# Patient Record
Sex: FEMALE | Race: WHITE | NOT HISPANIC OR LATINO | Employment: UNEMPLOYED | URBAN - METROPOLITAN AREA
[De-identification: names, ages, dates, MRNs, and addresses within clinical notes are randomized per-mention and may not be internally consistent; named-entity substitution may affect disease eponyms.]

---

## 2022-12-13 ENCOUNTER — HOSPITAL ENCOUNTER (OUTPATIENT)
Facility: HOSPITAL | Age: 57
Discharge: HOME | End: 2022-12-13
Attending: EMERGENCY MEDICINE

## 2022-12-13 VITALS
SYSTOLIC BLOOD PRESSURE: 145 MMHG | TEMPERATURE: 97.4 F | DIASTOLIC BLOOD PRESSURE: 81 MMHG | WEIGHT: 170 LBS | BODY MASS INDEX: 29.02 KG/M2 | HEART RATE: 76 BPM | OXYGEN SATURATION: 100 % | RESPIRATION RATE: 16 BRPM | HEIGHT: 64 IN

## 2022-12-13 DIAGNOSIS — U07.1 COVID-19 VIRUS INFECTION: Primary | ICD-10-CM

## 2022-12-13 DIAGNOSIS — R05.1 ACUTE COUGH: ICD-10-CM

## 2022-12-13 LAB
FLUAV SUBTYP SPEC NAA+PROBE: NOT DETECTED
FLUBV RNA ISLT QL NAA+PROBE: NOT DETECTED
SARS-COV-2 RNA RESP QL NAA+PROBE: DETECTED
STREP A PCR: NOT DETECTED

## 2022-12-13 PROCEDURE — 99203 OFFICE O/P NEW LOW 30 MIN: CPT | Performed by: EMERGENCY MEDICINE

## 2022-12-13 PROCEDURE — 87651 STREP A DNA AMP PROBE: CPT | Performed by: EMERGENCY MEDICINE

## 2022-12-13 PROCEDURE — 87880 STREP A ASSAY W/OPTIC: CPT | Performed by: EMERGENCY MEDICINE

## 2022-12-13 PROCEDURE — 87636 SARSCOV2 & INF A&B AMP PRB: CPT | Performed by: EMERGENCY MEDICINE

## 2022-12-13 PROCEDURE — G0463 HOSPITAL OUTPT CLINIC VISIT: HCPCS | Performed by: EMERGENCY MEDICINE

## 2022-12-13 PROCEDURE — EDLOS: Performed by: EMERGENCY MEDICINE

## 2022-12-13 RX ORDER — DEXTROMETHORPHAN HYDROBROMIDE AND PROMETHAZINE HYDROCHLORIDE 15; 6.25 MG/5ML; MG/5ML
5-10 SYRUP ORAL 4 TIMES DAILY PRN
Qty: 118 ML | Refills: 0 | Status: SHIPPED | OUTPATIENT
Start: 2022-12-13

## 2022-12-13 NOTE — FSED PROVIDER NOTE
Subjective   History of Present Illness  The patient is a 57-year-old  female who presents emergency room with complaints of upper respiratory infection.  Patient reports that her symptoms started 4 days ago.  Patient reports sore throat, headache, congestion and cough.  Patient states that her cough is harsh and keeps her up at night.  She states that she has had generalized body aches and fatigue.  She reports that her symptoms have been persistent and gradually worsening.  She states that this morning, she woke up and her left ear was bothering her, so she decided to come to the ER to get checked out.        Review of Systems   Constitutional: Positive for chills, fatigue and fever.   HENT: Positive for congestion, ear pain and rhinorrhea.    Eyes: Negative.    Respiratory: Positive for cough. Negative for chest tightness and shortness of breath.    Cardiovascular: Negative for chest pain and palpitations.   Gastrointestinal: Negative for abdominal pain, diarrhea, nausea and vomiting.   Genitourinary: Negative.    Musculoskeletal: Positive for arthralgias and myalgias.   Skin: Negative.  Negative for rash.   Neurological: Positive for weakness and headaches. Negative for syncope and numbness.   Psychiatric/Behavioral: Negative.    All other systems reviewed and are negative.      No past medical history on file.    No Known Allergies    No past surgical history on file.    No family history on file.    Social History     Socioeconomic History   • Marital status: Single           Objective   Physical Exam  Vitals and nursing note reviewed.   Constitutional:       General: She is not in acute distress.     Appearance: She is normal weight. She is not ill-appearing.      Comments: The following exam was performed from a distance of 6 feet.  The patient had presumed or suspected upper respiratory infection that may be COVID-19.  The patient was in a negative pressure room if possible.  The provider as well  as the patient and/or family members were asked to wear a mask when possible.   HENT:      Head: Normocephalic and atraumatic.      Right Ear: External ear normal.      Left Ear: External ear normal.      Nose: Nose normal.   Eyes:      Extraocular Movements: Extraocular movements intact.      Conjunctiva/sclera: Conjunctivae normal.      Pupils: Pupils are equal, round, and reactive to light.   Cardiovascular:      Rate and Rhythm: Normal rate and regular rhythm.      Comments: Per the bedside cardiac monitor  Pulmonary:      Effort: Pulmonary effort is normal. No respiratory distress.      Comments: Patient's oxygen saturation was greater than 90% per the bedside pulse oximetry.  There were NO audible abnormal breath sounds present.  Abdominal:      General: Abdomen is flat. There is no distension.   Musculoskeletal:         General: No swelling, deformity or signs of injury. Normal range of motion.      Cervical back: Normal range of motion and neck supple.   Skin:     General: Skin is warm.      Capillary Refill: Capillary refill takes less than 2 seconds.      Findings: No erythema.   Neurological:      General: No focal deficit present.      Mental Status: She is alert and oriented to person, place, and time. Mental status is at baseline.   Psychiatric:         Mood and Affect: Mood normal.         Procedures           ED Course  ED Course as of 12/13/22 0803   Tue Dec 13, 2022   0757 Patient is COVID-19 positive. [KZ]   0802 Strep screen negative.  Patient advised supportive care and written some cough medication. [KZ]      ED Course User Index  [KZ] Ladarius Quiñonez MD                                           [facility]    Final diagnoses:   COVID-19 virus infection   Acute cough       ED Disposition  ED Disposition     ED Disposition   Discharge    Condition   Stable    Comment   --             Your PCP    Schedule an appointment as soon as possible for a visit in 1 week  For repeat evaluation         Medication  List      New Prescriptions    promethazine-dextromethorphan 6.25-15 MG/5ML syrup  Commonly known as: PROMETHAZINE-DM  Take 5-10 mL by mouth 4 (Four) Times a Day As Needed for Cough.           Where to Get Your Medications      These medications were sent to [facility]/pharmacy #[id number] - [place], [place] - [address] [facility] - [phone]  - [phone] [address] [place] [address]    Hours: 24-hours Phone: [phone]   · promethazine-dextromethorphan 6.25-15 MG/5ML syrup

## 2022-12-13 NOTE — DISCHARGE INSTRUCTIONS
You have been diagnosed with COVID-19 infection, no antibiotics are necessary as this condition is caused by a virus.  Antivirals have not been prescribed for you because of either not enough risk factors or interactions with your home medications.    Rotate Tylenol and ibuprofen every 3-4 hours as needed for fever and pain.  Over-the-counter medications may be also helpful for your symptoms.  Take cough medication as prescribed.  Follow-up with primary care physician in 1 week for recheck of your symptoms.  Quarantine period is 5 days for vaccinated patients and 7 days for nonvaccinated patients.

## 2023-01-06 ENCOUNTER — HOSPITAL ENCOUNTER (EMERGENCY)
Facility: HOSPITAL | Age: 58
Discharge: HOME | End: 2023-01-06
Attending: EMERGENCY MEDICINE | Admitting: EMERGENCY MEDICINE
Payer: COMMERCIAL

## 2023-01-06 ENCOUNTER — APPOINTMENT (OUTPATIENT)
Dept: CT IMAGING | Facility: HOSPITAL | Age: 58
End: 2023-01-06
Payer: COMMERCIAL

## 2023-01-06 VITALS
BODY MASS INDEX: 28.32 KG/M2 | RESPIRATION RATE: 16 BRPM | HEIGHT: 65 IN | HEART RATE: 79 BPM | WEIGHT: 170 LBS | DIASTOLIC BLOOD PRESSURE: 72 MMHG | SYSTOLIC BLOOD PRESSURE: 147 MMHG | OXYGEN SATURATION: 95 % | TEMPERATURE: 98.3 F

## 2023-01-06 DIAGNOSIS — N12 PYELONEPHRITIS: Primary | ICD-10-CM

## 2023-01-06 DIAGNOSIS — R10.9 RIGHT FLANK PAIN: ICD-10-CM

## 2023-01-06 LAB
BILIRUB UR QL STRIP: NEGATIVE
CLARITY UR: CLEAR
COLOR UR: YELLOW
GLUCOSE UR STRIP-MCNC: NEGATIVE MG/DL
HGB UR QL STRIP.AUTO: ABNORMAL
KETONES UR QL STRIP: NEGATIVE
LEUKOCYTE ESTERASE UR QL STRIP.AUTO: ABNORMAL
NITRITE UR QL STRIP: POSITIVE
PH UR STRIP.AUTO: 5.5 [PH] (ref 5–8)
PROT UR QL STRIP: ABNORMAL
SP GR UR STRIP: 1.01 (ref 1–1.03)
UROBILINOGEN UR QL STRIP: ABNORMAL

## 2023-01-06 PROCEDURE — 25010000002 CEFTRIAXONE PER 250 MG: Performed by: EMERGENCY MEDICINE

## 2023-01-06 PROCEDURE — 99283 EMERGENCY DEPT VISIT LOW MDM: CPT

## 2023-01-06 PROCEDURE — 99284 EMERGENCY DEPT VISIT MOD MDM: CPT | Performed by: EMERGENCY MEDICINE

## 2023-01-06 PROCEDURE — 74176 CT ABD & PELVIS W/O CONTRAST: CPT

## 2023-01-06 PROCEDURE — 96372 THER/PROPH/DIAG INJ SC/IM: CPT

## 2023-01-06 PROCEDURE — 81003 URINALYSIS AUTO W/O SCOPE: CPT | Performed by: EMERGENCY MEDICINE

## 2023-01-06 RX ORDER — CIPROFLOXACIN 500 MG/1
500 TABLET, FILM COATED ORAL 2 TIMES DAILY
Qty: 20 TABLET | Refills: 0 | Status: SHIPPED | OUTPATIENT
Start: 2023-01-06 | End: 2023-01-16

## 2023-01-06 RX ORDER — HYDROCODONE BITARTRATE AND ACETAMINOPHEN 5; 325 MG/1; MG/1
1 TABLET ORAL EVERY 6 HOURS PRN
Qty: 12 TABLET | Refills: 0 | Status: SHIPPED | OUTPATIENT
Start: 2023-01-06

## 2023-01-06 RX ORDER — PHENAZOPYRIDINE HYDROCHLORIDE 200 MG/1
200 TABLET, FILM COATED ORAL 3 TIMES DAILY PRN
Qty: 6 TABLET | Refills: 0 | Status: SHIPPED | OUTPATIENT
Start: 2023-01-06 | End: 2023-01-08

## 2023-01-06 RX ADMIN — LIDOCAINE HYDROCHLORIDE 1 G: 10 INJECTION, SOLUTION EPIDURAL; INFILTRATION; INTRACAUDAL; PERINEURAL at 17:07

## 2023-01-06 NOTE — Clinical Note
[facility] FSED [place] [id number] [place] [address] [place] [address]  Phone: [phone]    Lavern Christensen was seen and treated in our emergency department on 1/6/2023.  She may return to work on 01/09/2023.         Thank you for choosing [facility].    Ladarius Quiñonez MD

## 2023-01-06 NOTE — DISCHARGE INSTRUCTIONS
Take antibiotics as prescribed.  Take pain medication as prescribed.  Drink plenty of fluids.  Follow-up with urology if not improved in 1 week.  Return to ER for any concerns.

## 2023-01-06 NOTE — FSED PROVIDER NOTE
Subjective   History of Present Illness  The patient is a 58-year-old female who had COVID last month, who presents emergency room with right flank pain.  She reports a 24-hour history of acute worsening of right flank pain.  Patient's flank pain radiates to her right lower abdomen.  She denies any associated nausea or vomiting.  She became concerned today when she started to get some generalized illness feelings including sweating for no reason.  She also reports dysuria, urinary urgency and frequency.        Review of Systems   Constitutional: Negative.  Negative for chills, fatigue and fever.   Eyes: Negative.    Respiratory: Negative for cough, chest tightness and shortness of breath.    Cardiovascular: Negative for chest pain and palpitations.   Gastrointestinal: Negative for abdominal pain, diarrhea, nausea and vomiting.   Genitourinary: Positive for dysuria, flank pain, frequency and urgency.   Skin: Negative.  Negative for rash.   Neurological: Negative.  Negative for syncope, weakness, numbness and headaches.   Psychiatric/Behavioral: Negative.    All other systems reviewed and are negative.      History reviewed. No pertinent past medical history.    No Known Allergies    History reviewed. No pertinent surgical history.    History reviewed. No pertinent family history.    Social History     Socioeconomic History   • Marital status: Single           Objective   Physical Exam  Vitals and nursing note reviewed.   Constitutional:       General: She is in acute distress.      Appearance: Normal appearance. She is normal weight.   HENT:      Right Ear: External ear normal.      Left Ear: External ear normal.      Nose: Nose normal.   Cardiovascular:      Rate and Rhythm: Normal rate.   Pulmonary:      Effort: Pulmonary effort is normal.   Abdominal:      Tenderness: There is right CVA tenderness.   Musculoskeletal:         General: Normal range of motion.      Cervical back: Normal range of motion.   Skin:      Capillary Refill: Capillary refill takes less than 2 seconds.   Neurological:      General: No focal deficit present.      Mental Status: She is alert and oriented to person, place, and time.   Psychiatric:         Mood and Affect: Mood normal.         Procedures           ED Course  ED Course as of 01/06/23 1718 Fri Jan 06, 2023 1715 Patient's urine shows infection.  CT scan shows right kidney inflammation with proximal ureter inflammation as well.  Patient given IM Rocephin and discharged home with ciprofloxacin, Norco and Pyridium. [KZ]      ED Course User Index  [KZ] Ladarius Quiñonez MD                                           Medical Decision Making  Patient presenting with flank pain Differential included UTI, pyelonephritis, diverticulitis, nephrolithiasis, appendicitis and/or kidney stone/ureterolithiasis. Also considered but less likely given history and physical exam included constipation, bowel perforation, gastritis, pancreatitis, mesenteric ischemia. Patient is nontoxic-appearing.  Vital signs within normal limits.     Plan: UA and imaging    Patient CT scan shows probable pyelonephritis.  Urinalysis is positive for infection.    Pyelonephritis: complicated acute illness or injury  Right flank pain: complicated acute illness or injury  Amount and/or Complexity of Data Reviewed  Labs: ordered. Decision-making details documented in ED Course.  Radiology: ordered.      Risk  Prescription drug management.          Final diagnoses:   Pyelonephritis   Right flank pain       ED Disposition  ED Disposition     ED Disposition   Discharge    Condition   Stable    Comment   --             [facility] - [facility]  [address] [facility] [address]  [phone]  Schedule an appointment as soon as possible for a visit in 1 week  If not improved         Medication List      New Prescriptions    ciprofloxacin 500 MG tablet  Commonly known as: CIPRO  Take 1 tablet by mouth 2 (Two) Times a Day for 10 days.      HYDROcodone-acetaminophen 5-325 MG per tablet  Commonly known as: NORCO  Take 1 tablet by mouth Every 6 (Six) Hours As Needed for Severe Pain or Moderate Pain.     phenazopyridine 200 MG tablet  Commonly known as: PYRIDIUM  Take 1 tablet by mouth 3 (Three) Times a Day As Needed for Bladder Spasms for up to 2 days.           Where to Get Your Medications      These medications were sent to [facility]/pharmacy #[id number] - [place], [place] - [address] [facility] - [phone]  - [phone] [address] [place] [address]    Hours: 24-hours Phone: [phone]   · ciprofloxacin 500 MG tablet  · HYDROcodone-acetaminophen 5-325 MG per tablet  · phenazopyridine 200 MG tablet

## 2023-03-12 ENCOUNTER — HOSPITAL ENCOUNTER (OUTPATIENT)
Facility: HOSPITAL | Age: 58
Discharge: HOME | End: 2023-03-12
Attending: EMERGENCY MEDICINE | Admitting: EMERGENCY MEDICINE
Payer: MEDICAID

## 2023-03-12 VITALS
HEART RATE: 83 BPM | SYSTOLIC BLOOD PRESSURE: 128 MMHG | TEMPERATURE: 98.5 F | DIASTOLIC BLOOD PRESSURE: 80 MMHG | RESPIRATION RATE: 18 BRPM | BODY MASS INDEX: 29.02 KG/M2 | WEIGHT: 170 LBS | HEIGHT: 64 IN | OXYGEN SATURATION: 98 %

## 2023-03-12 DIAGNOSIS — S80.212A ABRASION OF LEFT KNEE, INITIAL ENCOUNTER: ICD-10-CM

## 2023-03-12 DIAGNOSIS — S60.511A ABRASION OF RIGHT HAND, INITIAL ENCOUNTER: ICD-10-CM

## 2023-03-12 DIAGNOSIS — S01.511A LIP LACERATION, INITIAL ENCOUNTER: Primary | ICD-10-CM

## 2023-03-12 PROCEDURE — G0463 HOSPITAL OUTPT CLINIC VISIT: HCPCS | Performed by: EMERGENCY MEDICINE

## 2023-03-12 PROCEDURE — 99213 OFFICE O/P EST LOW 20 MIN: CPT | Performed by: EMERGENCY MEDICINE

## 2023-03-12 RX ORDER — CLINDAMYCIN HYDROCHLORIDE 150 MG/1
450 CAPSULE ORAL 3 TIMES DAILY
Qty: 63 CAPSULE | Refills: 0 | Status: SHIPPED | OUTPATIENT
Start: 2023-03-12 | End: 2023-03-19

## 2023-03-12 RX ORDER — CHLORHEXIDINE GLUCONATE 0.12 MG/ML
15 RINSE ORAL 4 TIMES DAILY
Qty: 473 ML | Refills: 0 | Status: SHIPPED | OUTPATIENT
Start: 2023-03-12 | End: 2023-03-17

## 2023-03-12 NOTE — ED TRIAGE NOTES
Pt to ED via Pv. Pt c/o mechanical fall Friday. Pt with abrasion and swelling to upper lip. Abrasion noted to nose, bruising on chin. Pt also c/o left knee pain.     Denies LOC, denies blood thinner. Denies neck pain.

## 2023-03-12 NOTE — DISCHARGE INSTRUCTIONS
Please read the attached discharge instructions.  Come back to the emergency department for any new or concerning symptoms.    Swish and spit the Peridex 4 times daily, making sure that there is contact with the inner lip laceration.    Twice daily, wash your wounds with antibacterial soap, then apply mupirocin ointment.

## 2023-03-12 NOTE — FSED PROVIDER NOTE
[facility] FREE-STANDING ED / URGENT CARE    Patient: Lavern Christensen 1965 [id number]  Physician: Jacqueline Joseph MD  DOS: 3/12/2023    Triage note:  Fall      HPI  History of Present Illness  58-year-old woman presents with abrasion to bridge of the nose, left upper lip, right palm, and left knee, sustained when she tripped and fell on asphalt 2 days ago.  She washed out the area as well and saw some improvement yesterday, but worsened today and she noticed some yellow crusts on the laceration of her lip and nasal bridge.  Denies loss of consciousness with the fall.  No numbness or tingling in any of her extremities.  No persistent headache.  She has been applying bacitracin or Neosporin ointment to the lip.        History is given by    Prior to Admission medications    Medication Sig Start Date End Date Taking? Authorizing Provider   chlorhexidine (PERIDEX) 0.12 % solution Apply 15 mL to the mouth or throat 4 (Four) Times a Day for 5 days. 3/12/23 3/17/23  Jacqueline Joseph MD   clindamycin (CLEOCIN) 150 MG capsule Take 3 capsules by mouth 3 (Three) Times a Day for 7 days. 3/12/23 3/19/23  Jacqueline Joseph MD   HYDROcodone-acetaminophen (NORCO) 5-325 MG per tablet Take 1 tablet by mouth Every 6 (Six) Hours As Needed for Severe Pain or Moderate Pain. 1/6/23   Ladarius Quiñonez MD   mupirocin (BACTROBAN) 2 % ointment Apply 1 application topically to the appropriate area as directed 2 (Two) Times a Day for 5 days. 3/12/23 3/17/23  Jacqueline Joseph MD   promethazine-dextromethorphan (PROMETHAZINE-DM) 6.25-15 MG/5ML syrup Take 5-10 mL by mouth 4 (Four) Times a Day As Needed for Cough. 12/13/22   Ladarius Quiñonez MD     No past medical history on file.  No past surgical history on file.  No family history on file.  Social History     Socioeconomic History   • Marital status: Single     No Known Allergies    PHYSICAL EXAM  Vital Signs (last day)     Date/Time Temp Temp src Pulse Resp BP Patient Position  SpO2    03/12/23 1349 98.5 (36.9) -- 83 18 128/80 -- 98        Physical Exam  Vitals and nursing note reviewed.   Constitutional:       Appearance: Normal appearance. She is not ill-appearing.   HENT:      Nose:      Comments: Abrasion to the bridge of the nose with overlying honey colored crusts     Mouth/Throat:      Comments: Moderate swelling of the left upper lip with well approximated flap laceration present laterally, honey colored crusts covering the wound.  On the inner/wet portion of the left upper lip, there is a 3 mm laceration with white granulation tissue.  See photo below.  Eyes:      Conjunctiva/sclera: Conjunctivae normal.   Pulmonary:      Effort: Pulmonary effort is normal.   Abdominal:      General: There is no distension.   Musculoskeletal:      Comments: Mild swelling and bruising of the left knee.  Full range of motion without significant pain.   Skin:     Coloration: Skin is not pale.      Comments: Abrasion to the right palm and left knee.   Neurological:      Mental Status: She is alert. Mental status is at baseline.   Psychiatric:         Mood and Affect: Mood normal.         Behavior: Behavior normal.                   DIAGNOSTICS  No radiology results for the last 7 days    Labs Reviewed - No data to display      MDM  Number of Diagnoses or Management Options  Abrasion of left knee, initial encounter  Abrasion of right hand, initial encounter  Lip laceration, initial encounter  Diagnosis management comments: Patient likely has infection of the laceration due to through and through lip lac/bite.  No gaping laceration requiring suture.  Patient has been given wound care instructions and prescribed mupirocin and clindamycin.  She has also been prescribed Peridex swish and spit.  She has been given a referral to plastic surgery for wound follow-up.      All results from this visit have been reviewed.       New Medications Ordered This Visit   Medications   • mupirocin (BACTROBAN) 2 %  ointment     Sig: Apply 1 application topically to the appropriate area as directed 2 (Two) Times a Day for 5 days.     Dispense:  15 g     Refill:  0   • chlorhexidine (PERIDEX) 0.12 % solution     Sig: Apply 15 mL to the mouth or throat 4 (Four) Times a Day for 5 days.     Dispense:  473 mL     Refill:  0   • clindamycin (CLEOCIN) 150 MG capsule     Sig: Take 3 capsules by mouth 3 (Three) Times a Day for 7 days.     Dispense:  63 capsule     Refill:  0       Procedures    Final diagnoses:   Lip laceration, initial encounter   Abrasion of right hand, initial encounter   Abrasion of left knee, initial encounter       [facility]  [facility] [place] [address]  [phone]  Schedule an appointment as soon as possible for a visit in 1 day  For wound re-check      ED Disposition     ED Disposition   Discharge    Condition   Stable    Comment   --             Jacqueline Joseph MD

## 2023-03-15 ENCOUNTER — HOSPITAL ENCOUNTER (OUTPATIENT)
Facility: HOSPITAL | Age: 58
Discharge: HOME | End: 2023-03-15
Attending: EMERGENCY MEDICINE | Admitting: EMERGENCY MEDICINE
Payer: MEDICAID

## 2023-03-15 VITALS
TEMPERATURE: 97.7 F | WEIGHT: 170 LBS | SYSTOLIC BLOOD PRESSURE: 128 MMHG | HEART RATE: 80 BPM | RESPIRATION RATE: 18 BRPM | DIASTOLIC BLOOD PRESSURE: 86 MMHG | OXYGEN SATURATION: 96 % | BODY MASS INDEX: 29.02 KG/M2 | HEIGHT: 64 IN

## 2023-03-15 DIAGNOSIS — S60.511D: Primary | ICD-10-CM

## 2023-03-15 DIAGNOSIS — S00.81XD ABRASION OF FACE, SUBSEQUENT ENCOUNTER: ICD-10-CM

## 2023-03-15 PROCEDURE — 99213 OFFICE O/P EST LOW 20 MIN: CPT

## 2023-03-15 PROCEDURE — G0463 HOSPITAL OUTPT CLINIC VISIT: HCPCS

## 2023-03-15 NOTE — FSED PROVIDER NOTE
EMERGENCY DEPARTMENT ENCOUNTER    Room Number:  09/09  Date seen:  3/15/2023  Time seen: 15:22 EDT  PCP: Provider, No Known  Historian: patient    HPI:  Chief complaint: wound check  Context:Lavern Christensen is a 58 y.o. female who presents to the ED with c/o wound check.  The patient states that she was seen on Sunday after a fall.  She reports that he has an abrasion to her right palm and is concerned that it might be infected.  The patient states that she did get placed on antibiotics at the time of being seen on Sunday along with mupirocin ointment.  The patient reports that her pain has improved greatly from when she was seen on Sunday.  The patient is nontoxic in appearance.  She denies any fever.    Timing: Intermittent  Duration: Since Sunday  Location: Right palm lip and nose  Radiation: Nonradiating  Intensity/Severity: Mild   associated Symptoms: Healing wound      The patient was placed in a mask in triage, hand hygiene was performed before and after my interaction with the patient.  I wore a mask and gloves during my entire interaction with the patient.    MEDICAL RECORD REVIEW  Recent fall    ALLERGIES  Patient has no known allergies.    PAST MEDICAL HISTORY  Active Ambulatory Problems     Diagnosis Date Noted   • No Active Ambulatory Problems     Resolved Ambulatory Problems     Diagnosis Date Noted   • No Resolved Ambulatory Problems     No Additional Past Medical History       PAST SURGICAL HISTORY  History reviewed. No pertinent surgical history.    FAMILY HISTORY  History reviewed. No pertinent family history.    SOCIAL HISTORY  Social History     Socioeconomic History   • Marital status: Single       REVIEW OF SYSTEMS  Review of Systems    All systems reviewed and negative except for those discussed in HPI.     PHYSICAL EXAM    I have reviewed the triage vital signs and nursing notes.    ED Triage Vitals [03/15/23 1514]   Temp Heart Rate Resp BP SpO2   97.7 °F (36.5 °C) 80 18 128/86 96 %      Temp  src Heart Rate Source Patient Position BP Location FiO2 (%)   Temporal Monitor Sitting Left arm --       Physical Exam  Constitutional:       General: She is not in acute distress.     Appearance: Normal appearance. She is not toxic-appearing.   Cardiovascular:      Pulses: Normal pulses.   Pulmonary:      Effort: Pulmonary effort is normal.   Musculoskeletal:         General: Normal range of motion.   Skin:     General: Skin is warm.      Comments: Healing wound noted to lip nose and right palm   Neurological:      General: No focal deficit present.      Mental Status: She is alert.   Psychiatric:         Mood and Affect: Mood normal.         Behavior: Behavior normal.         Vital signs and nursing notes reviewed.                    LAB RESULTS  No results found for this or any previous visit (from the past 24 hour(s)).    Ordered the above labs and independently reviewed the results.      RADIOLOGY RESULTS  No Radiology Exams Resulted Within Past 24 Hours       I ordered the above noted radiological studies. Independently reviewed by me and discussed with radiologist.  See dictation above for official radiology interpretation.      Orders placed during this visit:  No orders of the defined types were placed in this encounter.                 PROCEDURES    Procedures        MEDICATIONS GIVEN IN ER    Medications - No data to display      PROGRESS, DATA ANALYSIS, CONSULTS, AND MEDICAL DECISION MAKING    All labs have been independently reviewed by me.  All radiology studies have been reviewed by me.   EKG's independently reviewed by me.  Discussion below represents my analysis of pertinent findings related to patient's condition, differential diagnosis, treatment plan and final disposition.    I rechecked the patient.  I discussed the patient's labs, radiology findings (including all incidental findings), diagnosis, and plan for discharge.  A repeat exam reveals no new worrisome changes from my initial exam  findings.  The patient understands that the fact that they are being discharged does not denote that nothing is abnormal, it indicates that no clinical emergency is present and that they must follow-up as directed in order to properly maintain their health.  Follow-up instructions (specifically listed below) and return to ER precautions were given at this time.  I specifically instructed the patient to follow-up with their PCP.  The patient understands and agrees with the plan, and is ready for discharge.  All questions answered.         AS OF 15:33 EDT VITALS:    BP - 128/86  HR - 80  TEMP - 97.7 °F (36.5 °C) (Temporal)  02 SATS - 96%    Medical Decision Making  MEDICAL DECISION  The patient states that she was seen on Sunday for a fall and abrasion to her right palm nose and lip.  She reports that she has been taking the antibiotics and using the mupirocin ointment as prescribed.  The right hand abrasion was noted to be dry and I encouraged her to use Vaseline to the area for moisture.  She is to follow-up with her primary care provider return for any new or worsening symptoms.  She reports understanding denies any questions at this time        I wore protective equipment throughout this patient encounter to include mask. Hand hygiene was performed before donning protective equipment and after removal when leaving the room.    Abrasion of face, subsequent encounter: acute illness or injury  Abrasion of palm of hand, right, subsequent encounter: acute illness or injury          DIAGNOSIS  Final diagnoses:   Abrasion of palm of hand, right, subsequent encounter   Abrasion of face, subsequent encounter         Pt masked in first look. I wore a surgical mask throughout my encounters with the pt. I performed hand hygiene on entry into the pt room and upon exit.     Dictated utilizing Dragon dictation     Note Disclaimer: At [facility], we believe that sharing information builds trust and better relationships. You  are receiving this note because you recently visited [facility]. It is possible you will see health information before a provider has talked with you about it. This kind of information can be easy to misunderstand. To help you fully understand what it means for your health, we urge you to discuss this note with your provider.

## 2023-03-15 NOTE — DISCHARGE INSTRUCTIONS
Thank you for letting us care for you today.  Continue take the antibiotics as previously prescribed.  You can start using Vaseline to the wounds for moisture.  Follow-up with your primary care provider.  Return for any new or worsening symptoms.

## 2023-03-15 NOTE — ED NOTES
Pt reports she was seen on Sunday after a fall. Pt has abrasion on right palm and is concerned for possible infection.